# Patient Record
Sex: FEMALE | Race: OTHER | NOT HISPANIC OR LATINO | Employment: OTHER | URBAN - METROPOLITAN AREA
[De-identification: names, ages, dates, MRNs, and addresses within clinical notes are randomized per-mention and may not be internally consistent; named-entity substitution may affect disease eponyms.]

---

## 2019-05-06 NOTE — PATIENT DISCUSSION
PTOSIS  AND BLEPHAROPLASTY OU  WILL FOLLOW AFTER CATARACT  SURGERY TO SEE HOW MUCH VISUAL IMPROVEMENT OCCURS .

## 2019-05-06 NOTE — PATIENT DISCUSSION
POAG, OU: INTRAOCULAR PRESSURE IS WITHIN ACCEPTABLE LIMITS. PT INSTRUCTED TO CONTINUE TRAVATAN Z QHS OU AND RETURN FOR FOLLOW-UP AS SCHEDULED.

## 2019-05-13 NOTE — PATIENT DISCUSSION
New Prescription: prednisolone acetate (prednisolone acetate): drops,suspension: 1% 1 drop four times a day into affected eye 05-

## 2019-05-23 NOTE — PATIENT DISCUSSION
1 DAY POST-OP, OD- PT IOP ELEVAED. BURPED AT TODAY'S VISIT. IOP FROM 58-8. GAVE SAMPLE OF COMBIGAN TO USE 2 TIMES A DAY UNTIL NEXT WEEK. Ocuflox-1 drop 4 times a day until next visit. Acular-1 drop 4 times a day until next visit. Pred-forte-start using today, 1 drop 4 times a day until next visit. Patient was also given instruction sheet.

## 2019-05-23 NOTE — PATIENT DISCUSSION
Continue: prednisolone acetate (prednisolone acetate): drops,suspension: 1% 1 drop four times a day into affected eye 05-

## 2019-06-11 NOTE — PATIENT DISCUSSION
POST OP OD: SOME CELLS SEEN ON TODAY'S EXAM. USE PRED FORTE 4 TIMES A DAY FOR A WEEK THEN BEGIN TO TAPER AGAIN. PT COMPLAINS THAT HIS EYES FEEL DRY AND THAT IS WHEN IT IS PAINFUL. RECOMMENDED USING ARTIFICIAL TEARS 2-3 TIMES A DAY.

## 2019-06-20 NOTE — PATIENT DISCUSSION
1 DAY POST-OP, OS WITH I STENT - Ocuflox-1 drop 4 times a day until next visit. Acular-1 drop 4 times a day until next visit. Pred-forte-start using today, 1 drop 4 times a day until next visit. Patient was also given instruction sheet.

## 2019-06-21 NOTE — PATIENT DISCUSSION
S/P PCIOL OD: IOP INCREASED. [address] INSTILLED STAT. DR. Leeroy Rabago IN TO BURP INCISION SITE. PATIENT LEFT MORE WITH MORE OCULAR COMFORT. CONTINUE TRAVATAN QHS OU, START COMBIGAN BID OS.  FOLLOW-UP WITH DR. Sima Hodge AS SCHEDULED FOR TUESDAY

## 2019-06-24 NOTE — PATIENT DISCUSSION
POSTOP: CELLS PRESENT. IOP ELEVATED OS. PT IS TO STOP TRAVATAN FOR NOW. CONTINUE TO USE COMBIGAN TID AND START ROCKLATAN AT BEDTIME IN THE LEFT EYE. PT WAS BURPED IN OFFICE TODAY TO HELP BRING THE PRESSURE DOWN.  PT IS TO COME BACK IN [facility]

## 2019-06-27 NOTE — PATIENT DISCUSSION
POST OP OS : INFLAMATION , CELL , PATIENT IS TO START PRED FORTE 1 GTT EVERY 2 HOURS WHILE AWAKE , CONTINUE ACULAR AND OCUFLOX 4 TIMES DIALY , TRAVATAN Z 1 DROP AT BED TIME , ROCKLATAN 1 DROP AT BEDTIME AND COMBIGAN 1 DROP TWICE DAILY , WILL FOLLOW BACK ON FRIDAY WITH FREDERICK AND THEN ON TUESDAY WITH FREDERICK IN [place]

## 2019-06-28 NOTE — PATIENT DISCUSSION
POST OP OS : INFLAMATION , CELL , PATIENT IS TO CONTINUE PRED FORTE 1 GTT EVERY 2 HOURS WHILE AWAKE , CONTINUE ACULAR AND OCUFLOX 4 TIMES DIALY , TRAVATAN Z 1 DROP AT BED TIME , ROCKLATAN 1 DROP AT BEDTIME AND COMBIGAN 1 DROP TWICE DAILY ,  FOLLOW UP WITH ME ON TUESDAY IN [place]

## 2019-07-02 NOTE — PATIENT DISCUSSION
POST OP OS : INFLAMATION , CELL , PATIENT IS TO CONTINUE PRED FORTE 1 GTT EVERY 4 HOURS WHILE AWAKE , CONTINUE ACULAR AND OCUFLOX 4 TIMES DIALY , TRAVATAN Z 1 DROP AT BED TIME , ROCKLATAN 1 DROP AT BEDTIME AND COMBIGAN 1 DROP TWICE DAILY , FOLLOW UP WITH ME ON TUESDAY IN [place]

## 2019-07-08 NOTE — PATIENT DISCUSSION
CAT  WITH I STENT POST OP OS:  +SIGNIFICANT CELL AND INCREASE IOP , CONSULT DR. Carlos Terrell  . START PRED FORTE 1 GTT EVERY 2 HOURS AGAIN AND CONTINUE ALL OTHER DROPS PER WRITTEN INSTRUCTIONS .

## 2019-07-10 NOTE — PATIENT DISCUSSION
ANTERIOR UVEITIS, OS: PRESCRIBED PREDNISOLONE 1 DROP EVERY HOUR OS, WHILE AWAKE. RETURN FOR FOLLOW UP AS SCHEDULED.

## 2019-07-10 NOTE — PATIENT DISCUSSION
New Prescription: Combigan (brimonidine-timolol): drops: 0.2-0.5% 1 drop three times a day as directed into left eye 07-

## 2019-07-10 NOTE — PATIENT DISCUSSION
New Prescription: acetazolamide (acetazolamide): tablet: 250 mg 2 tablet every twelve hours as directed by mouth 07-

## 2019-07-10 NOTE — PATIENT DISCUSSION
Uveitis Counseling: I have explained the diagnosis and pathophysiology of uveitis. In isolated circumstances this is usually idiopathic, however recurrent intraocular inflammation may be associated with systemic disease and medical and laboratory workup may be necessary in conjunction with the primary care doctor. I have stressed compliance with medications and follow-up.

## 2019-07-10 NOTE — PATIENT DISCUSSION
New Prescription: prednisolone acetate (prednisolone acetate): drops,suspension: 1% 1 drop as directed into left eye 07-

## 2019-07-10 NOTE — PATIENT DISCUSSION
RETINA IS ATTACHED OU: NO POSTERIOR UVEITIS; NO HOLES OR TEARS SEEN ON DILATED EXAM TODAY.  RETINAL DETACHMENT SIGNS AND SYMPTOMS REVIEWED

## 2019-07-10 NOTE — PATIENT DISCUSSION
POAG, OU: STABLE IOP OD. ELEVATED INTRAOCULAR PRESSURE, OS. PRESCRIBED COMBIGAN 3X/DAY OS, DORZOLAMIDE 3X/DAY OS, AND DIAMOX 500 MG Q12 HR PO. CONTINUE  TRAVATAN QHS OD ONLY (HOLD LEFT EYE DUE TO INFLAMMATION). RETURN FOR FOLLOW AS SCHEDULED.

## 2019-07-11 NOTE — PATIENT DISCUSSION
Continue: prednisolone acetate (prednisolone acetate): drops,suspension: 1% 1 drop as directed into left eye 07-

## 2019-07-11 NOTE — PATIENT DISCUSSION
Continue: Combigan (brimonidine-timolol): drops: 0.2-0.5% 1 drop three times a day as directed into left eye 07-

## 2019-07-11 NOTE — PATIENT DISCUSSION
Continue: acetazolamide (acetazolamide): tablet: 250 mg 2 tablet every twelve hours as directed by mouth 07-

## 2019-07-11 NOTE — PATIENT DISCUSSION
POST OP CATARACTS: INFLAMMATION LOOKS BETTER BUT IS STILL PRESENT. PT IS TO CONTINUE ALL DROPS AS DIRECTED BY DR. Yoly Garner. PT IS TO SEE DR. Marilia Damon.  PT TO SEE DR. Britany Dougherty IN 2 WEEKS FOR FOLLOWUP

## 2019-07-16 NOTE — PATIENT DISCUSSION
CONTINUE TRAVATAN QHS OD ONLY (HOLD LEFT EYE DUE TO INFLAMMATION). DISCONTINUE DIAMOX TABLETS. RETURN FOR FOLLOW AS SCHEDULED.

## 2019-07-16 NOTE — PATIENT DISCUSSION
ANTERIOR UVEITIS, OU: BEGIN PRED TAPER: 4X/DAY OU FOR 2 WEEKS, 3X/DAY OU FOR 2 WEEKS, THEN 2X/DAY OU UNTIL NEXT VISIT. RETURN FOR FOLLOW UP AS SCHEDULED.

## 2019-07-22 NOTE — PATIENT DISCUSSION
POSTOP: CONTIUE COMBIGAN 3X/DAY OS, DORZOLAMIDE 3X/DAY OS. CONTINUE TRAVATAN QHS OD ONLY AS DR. CESAR DIRECTED.  PT IS TO USE PRED FORTE EVERY HOUR FOR 3 DAYS, THEN EVERY 2 HOURS FOR 3 DAYS THEN 6 TIMES A DAY UNTIL WE SEE PT BACK IN 2 WEEKS

## 2019-08-27 NOTE — PATIENT DISCUSSION
Continue: prednisolone acetate (prednisolone acetate): drops,suspension: 1% 1 drop five times a day into affected eye 07-

## 2019-08-27 NOTE — PATIENT DISCUSSION
POAG, OU: RECOMMEND COMBIGAN 3X/DAY OU, DORZOLAMIDE 3X/DAY OU. STOP TRAVATAN QHS OU DUE TO CHRONIC INFLAMMATION. RETURN FOR FOLLOW AS SCHEDULED.

## 2019-09-10 NOTE — PATIENT DISCUSSION
POAG OU: IOP ELEVATED OU. BEGIN TRIAL OF VYZULTA 2X/DAY OU. CONTINUE COMBIGAN 3X/DAY AND DORZOLAMIDE 3X/DAY OU. (SAMPLE OF VYZULTA GIVEN).

## 2019-09-10 NOTE — PATIENT DISCUSSION
New Prescription: prednisone (prednisone): tablet: 10 mg 4 tablet every morning with meals by mouth 09-

## 2019-09-10 NOTE — PATIENT DISCUSSION
New Prescription: Pred Forte (prednisolone acetate): drops,suspension: 1% 1 drop four times a day as directed into both eyes 09-

## 2019-09-10 NOTE — PATIENT DISCUSSION
ANTERIOR UVEITIS, OU: CONTINUE PRED FORTE 4X/DAY OU. DISCUSSED RISK OF INCREASED TOPICAL STEROIDS DUE TO IOP RESPONSE. START PREDNISONE 40 MG PO DAILY. CONTINUE OMEPRAZOLE FOR GI PPX. RETURN FOR FOLLOW UP AS SCHEDULED.

## 2019-09-24 NOTE — PATIENT DISCUSSION
Continue: Pred Forte (prednisolone acetate): drops,suspension: 1% 1 drop four times a day as directed into both eyes 09-

## 2019-09-24 NOTE — PATIENT DISCUSSION
New Prescription: valacyclovir (valacyclovir): tablet: 500 mg 1 tablet three times a day as directed by mouth 09-

## 2019-09-24 NOTE — PATIENT DISCUSSION
POAG OU: REFER TO DR Alec LAO IN [facility]. IOP ELEVATED OU WITH GLAUCOMA CHANGES ON OPTIC DISC AND RNFL OCT. S/P PHACO WITH I-STENT OU (Stefan Jolley). CONTINUE VYZULTA 2X/DAY OU, COMBIGAN 3X/DAY AND DORZOLAMIDE 3X/DAY OU.

## 2019-09-24 NOTE — PATIENT DISCUSSION
ANTERIOR UVEITIS, OU:  HISTORY OF HERPES ZOSTER. CONTINUE PRED FORTE 3X/DAY OU. STOP PREDNISONE (NO IMPROVEMENT WITH 2-WEEK TRIAL). START VALACYCLOVIR 500 MG PO TID. UVEITIS LABS ORDERED.

## 2019-09-24 NOTE — PATIENT DISCUSSION
RETURN TO DR. LAGOS TO OBTAIN LABS FOR UVEITIS: ACE, QUANTIFERON-TB GOLD, SYPHILIS EIA, RPR, HSV-1 AND HSV-2 PCR, ZOSTER PCR, HLA-B27, AND A CHEST X-RAY PA/LAT. No

## 2019-10-22 NOTE — PATIENT DISCUSSION
Continue: Pred Forte (prednisolone acetate): drops,suspension: 1% 1 drop twice a day as directed into both eyes 09-

## 2019-10-22 NOTE — PATIENT DISCUSSION
POAG OU: REFER TO DR Elena LAO IN [place] FOR GLAUCOMA SURGERY EVAL. S/P PHACO WITH I-STENT OU (Joaquim Jadely). CONTINUE VYZULTA 2X/DAY OU, COMBIGAN 3X/DAY AND DORZOLAMIDE 3X/DAY OU.

## 2019-10-22 NOTE — PATIENT DISCUSSION
Continue: Combigan (brimonidine-timolol): drops: 0.2-0.5% 1 drop three times a day as directed into both eyes 10-

## 2019-10-22 NOTE — PATIENT DISCUSSION
ANTERIOR UVEITIS, OU: IMPROVING TODAY. HISTORY OF HERPES ZOSTER. TAPER TO PRED FORTE 2X/DAY OU. CONTINUE VALACYCLOVIR 500 MG PO DAILY. PENDING UVEITIS LABS AND CHEST XRAY ORDERED.

## 2019-10-28 NOTE — PATIENT DISCUSSION
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 DR. Ledezma Bees TO SEE PT BACK IN 1 MONTH FOR A F/U. DR. Billy Peace ALSO SAID THAT PT IS ON A PILL CALLED DIAMOX.

## 2019-11-19 NOTE — PATIENT DISCUSSION
Continue: Pred Forte (prednisolone acetate): drops,suspension: 1% 1 drop four times a day into left eye 09-

## 2019-11-19 NOTE — PATIENT DISCUSSION
ANTERIOR UVEITIS, OU: IMPROVING TODAY. HISTORY OF HERPES ZOSTER. CONTINUE PRED FORTE 2X/DAY OD (FOR LEFT EYE FOLLOW DR Dakota Yanes INSTRUCTIONS). CONTINUE VALACYCLOVIR 500 MG PO DAILY.

## 2019-11-19 NOTE — PATIENT DISCUSSION
POAG, OS: S/P TUBE SHUNT SURGERY; DOING VERY WELL. CONTINUE EYEDROPS IN LEFT EYE AS INSTRUCTED BY DR LAO.

## 2019-11-19 NOTE — PATIENT DISCUSSION
POAG, OD: ELEVATED INTRAOCULAR PRESSURE, OD. CLEARED TO HAVE TUBE SHUNT SURGERY OD WITH DR Philipp Prieto. CONTINUE EYEDROPS OD:  LATANOPROST OD 1 DROP AT BEDTIME. RESTART DORZOLAMIDE OD 3X/DAY AND COMBIGAN 3X/DAY OD. RETURN FOR FOLLOW AS SCHEDULED.

## 2019-12-17 NOTE — PATIENT DISCUSSION
ANTERIOR UVEITIS, OU: IMPROVING TODAY. HISTORY OF HERPES ZOSTER. DECREASED PRED FORTE TO 1X/DAY OD; CONTINUE 2X/DAY OS. CONTINUE VALACYCLOVIR 500 MG PO DAILY. RETURN FOR FOLLOW UP AS SCHEDULED.

## 2019-12-17 NOTE — PATIENT DISCUSSION
POAG, OU: ELEVATED INTRAOCULAR PRESSURE, OD. CLEARED FOR GLAUCOMA FILTERING SURGERY OD. CONTINUE LATANOPROST QHS, COMBIGAN 3X/DAY, AND TRUSOPT 3X/DAY OD. RETURN FOR FOLLOW AS SCHEDULED WITH DR Eulice Nyhan.

## 2019-12-17 NOTE — PATIENT DISCUSSION
Continue: Pred Forte (prednisolone acetate): drops,suspension: 1% 1 drop twice a day into left eye 09-

## 2019-12-17 NOTE — PATIENT DISCUSSION
Continue: Combigan (brimonidine-timolol): drops: 0.2-0.5% 1 drop three times a day as directed into both eyes

## 2020-03-24 NOTE — PATIENT DISCUSSION
ANTERIOR UVEITIS, OU:  HISTORY OF HERPES ZOSTER. BEGIN PRED FORTE 1 DROP Q2H WHILE AWAKE OS. GIVEN DUREZOL SAMPLE TO START, THEN CONTINUE SAME DOSING WITH PRED FORTE WHEN SAMPLE FINISHED. RETURN FOR FOLLOW UP AS SCHEDULED.

## 2020-03-24 NOTE — PATIENT DISCUSSION
New Prescription: prednisolone acetate (PF) (prednisolone acetate (pf)): drops,suspension: 1% 1 drop every two hours as directed into left eye 03-

## 2020-03-24 NOTE — PATIENT DISCUSSION
Stopped Today: prednisolone acetate (PF) (prednisolone acetate (pf)): drops,suspension: 1% 1 drop four times a day into right eye

## 2020-03-24 NOTE — PATIENT DISCUSSION
FLAT ANTERIOR CHAMBER, OS:  RECURRENT FLAT AC AFTER BAERVELDT REVISION WITH XEN GEL STENT. WILL CONSULT WITH DR LAO REGARDING POSTOP MANAGEMENT.

## 2020-03-24 NOTE — PATIENT DISCUSSION
POAG, OU: CONTINUE LATANOPROST QHS OD; TRUSOPT 3X/DAY OU. HOLD COMBIGAN AS INSTRUCTED BY DR LAO. RETURN FOR FOLLOW AS SCHEDULED WITH DR Tamela Redman.

## 2020-03-27 NOTE — PATIENT DISCUSSION
FLAT ANTERIOR CHAMBER, OS: RECURRENT FLAT AC AFTER BAERVELDT REVISION WITH XEN GEL STENT. NO SIGNIFICANT IMPROVEMENT AFTER STOPPING GLAUCOMA EYEDROPS OS FOR 3 DAYS. CONTINUE PRED FORTE Q2H OS WHILE AWAKE (RX GIVEN). RETURN TO DR LAO FOR SURGICAL GLAUCOMA MANAGEMENT.

## 2020-03-27 NOTE — PATIENT DISCUSSION
POAG, OU: CONTINUE LATANOPROST QHS OD; TRUSOPT 3X/DAY OD. HOLD COMBIGAN OD AS INSTRUCTED BY DR LAO. HOLD ALL GLAUCOMA DROPS OS.

## 2020-03-27 NOTE — PATIENT DISCUSSION
Continue: prednisolone acetate (PF) (prednisolone acetate (pf)): drops,suspension: 1% 1 drop every two hours as directed into left eye 03-

## 2020-04-21 NOTE — PATIENT DISCUSSION
ANTERIOR UVEITIS OU: CONTINUE PRED FORTE 1X/DAY OD.  TAPER PRED FORTE OS, AS INSTRUCTED BY DR LAO. RETURN AS SCHEDULED.

## 2020-04-21 NOTE — PATIENT DISCUSSION
FLAT ANTERIOR CHAMBER, OS: RECURRENT FLAT AC AFTER BAERVELDT REVISION WITH XEN GEL STENT. CONTINUE PRED FORTE Q3H OS WHILE AWAKE. RETURN TO DR LAO FOR SURGICAL GLAUCOMA MANAGEMENT.

## 2020-05-19 NOTE — PATIENT DISCUSSION
POAG, OU: IOP SIGNIFICANTLY INCREASED OS. OD: CONTINUE LATANOPROST QHS OD; TRUSOPT 3X/DAY. OS: RECOMMEND STARTING LATANOPROST AND TRUSOPT SAME DOSING TODAY. CALLING DR Joon Oliveira OFFICE TO SCHEDULE GLAUCOMA FOLLOW-UP THIS WEEK.

## 2020-05-19 NOTE — PATIENT DISCUSSION
ANTERIOR UVEITIS OU: BOTH EYES QUIET ON EXAM TODAY. CONTINUE PRED FORTE 1X/DAY OD. CONTINUE PRED FORTE OS BASED ON DR LAO'S INSTRUCTIONS FOR GLAUCOMA POSTOP MANAGEMENT. RETURN AS SCHEDULED.

## 2020-05-19 NOTE — PATIENT DISCUSSION
FLAT ANTERIOR CHAMBER, OS: S/P BAERVELDT REVISION WITH XEN GEL STENT. CONTINUE PRED FORTE Q3H OS WHILE AWAKE. RETURN TO DR LAO FOR SURGICAL GLAUCOMA MANAGEMENT.

## 2020-06-16 NOTE — PATIENT DISCUSSION
POAG, OU: IOP BETTER CONTROLLED TODAY OU. FOLLOW DOSING OF LATANOPROST AND TRUSOPT OU BASED ON DR LAO'S INSTRUCTIONS. RETURN AS SCHEDULED TO DR LAO.

## 2020-06-16 NOTE — PATIENT DISCUSSION
FLAT ANTERIOR CHAMBER, OS: CLEARED FOR ADDITIONAL GLAUCOMA SURGERY AND ANTERIOR CHAMBER REFORMATION WITH DR LAO.

## 2020-09-22 NOTE — PATIENT DISCUSSION
RETINA IS ATTACHED OD: NO HOLES OR TEARS SEEN ON DILATED EXAM TODAY.  RETINAL DETACHMENT SIGNS AND SYMPTOMS REVIEWED

## 2020-09-22 NOTE — PATIENT DISCUSSION
FLAT ANTERIOR CHAMBER, OS: RETURN AS SCHEDULED TO DR LAO, FOR GLAUCOMA SURGERY EVALUATION AND ANTERIOR CHAMBER REFORMATION.

## 2020-09-22 NOTE — PATIENT DISCUSSION
New Prescription: Lotemax (loteprednol etabonate): drops,suspension: 0.5% 1 drop once a day as directed into both eyes 09-

## 2020-09-22 NOTE — PATIENT DISCUSSION
ANTERIOR UVEITIS OU: BOTH EYES QUIET ON EXAM TODAY. STOP PRED FORTE 1X/DAY OU; AND START LOTEMAX 1X/DAY OU (RX AND SAMPLE GIVEN). RETURN AS SCHEDULED FOR FOLLOW-UP WITH DR Ja White.

## 2020-09-22 NOTE — PATIENT DISCUSSION
ADVANCED POAG, OU:  IOP ABOVE GOAL OU. RECOMMEND LATANOPROST QHS OU, AND DORZOLAMIDE 3X/DAY OU. CONTINUE ADDITIONAL GLAUCOMA EYEDROP RECOMMENDATIONS PER DR LAO. RETURN AS SCHEDULED TO DR LAO.

## 2020-12-15 NOTE — PATIENT DISCUSSION
Stopped Today: prednisolone acetate (PF) (prednisolone acetate (pf)): drops,suspension: 1% 1 drop every two hours as directed into left eye 03-

## 2020-12-15 NOTE — PATIENT DISCUSSION
CORNEAL EDEMA AND FLAT ANTERIOR CHAMBER, OS:  INSTRUCTED TO FOLLOW DR GENTRY LAO'S RECOMMENDATION FOR ADDITIONAL ANTERIOR SEGMENT SURGERY AND/OR REFERRAL FOR CORNEA SURGERY.

## 2020-12-15 NOTE — PATIENT DISCUSSION
Continue: Lotemax (loteprednol etabonate): drops,suspension: 0.5% 1 drop once a day as directed into both eyes 09-

## 2020-12-15 NOTE — PATIENT DISCUSSION
ADVANCED POAG, OU:  IOP WITHIN NORMAL LIMITS OD. IOP IMPROVING S/P TUBE REVISION SURGERY OS. CONTINUE TRAVATAN, COMBIGAN, AND DORZOLAMIDE OU. RETURN AS SCHEDULED TO DR LAO.

## 2020-12-15 NOTE — PATIENT DISCUSSION
ANTERIOR UVEITIS OU: BOTH EYES QUIET ON EXAM TODAY. CONTINUE VALACYCLOVIR 500 MG PO DAILY. CONTINUE LOTEMAX 1X/DAY OU. RETURN AS SCHEDULED FOR FOLLOW-UP WITH DR Gordo Franco.

## 2021-03-08 NOTE — PATIENT DISCUSSION
Continue: Combigan (brimonidine-timolol): drops: 0.2-0.5% 1 drop three times a day as directed into left eye 07-
Continue: Travatan Z (travoprost): drops: 0.004% 1 drop at bedtime as directed into right eye
Continue: acetazolamide (acetazolamide): tablet: 250 mg 2 tablet every twelve hours as directed by mouth 07-
Continue: dorzolamide (dorzolamide): drops: 2% 1 drop three times a day into left eye
Continue: prednisolone acetate (prednisolone acetate): drops,suspension: 1% 1 drop as directed into left eye 07-
General:
H20.013
INSTILL 1 DROP INTO LEFT EYE EVERY HOUR WHILE AWAKE.
Medications:
Primary iridocyclitis
both eyes, moderately severe, improving : continue all prescribed drop as instructed  last visit . keep folow up with Dr. Miguel Angel Gallagher .
None

## 2021-10-18 ENCOUNTER — NEW PATIENT COMPREHENSIVE (OUTPATIENT)
Dept: URBAN - METROPOLITAN AREA CLINIC 35 | Facility: CLINIC | Age: 66
End: 2021-10-18

## 2021-10-18 DIAGNOSIS — H52.7: ICD-10-CM

## 2021-10-18 DIAGNOSIS — H25.813: ICD-10-CM

## 2021-10-18 PROCEDURE — 92015 DETERMINE REFRACTIVE STATE: CPT

## 2021-10-18 PROCEDURE — 92004 COMPRE OPH EXAM NEW PT 1/>: CPT

## 2021-10-18 ASSESSMENT — TONOMETRY
OS_IOP_MMHG: 14
OD_IOP_MMHG: 15

## 2021-10-18 ASSESSMENT — VISUAL ACUITY
OD_SC: 20/100
OS_CC: J2
OU_CC: 20/20
OS_CC: 20/25
OD_CC: J2
OS_SC: 20/100
OU_CC: J1
OD_CC: 20/30
OU_SC: 20/70
OU_SC: J10

## 2021-12-14 NOTE — PATIENT DISCUSSION
Anterior Uveitis, OU: Both eyes quiet on exam today. Continue Valacyclovir 500 MG PO Daily. Continue Lotemax 1X/DAY OU. Return as scheduled for follow-up with Dr. Ministerio Clinton.

## 2021-12-14 NOTE — PATIENT DISCUSSION
Continue Latanoprost QHS OU, Dorzolamide BID OU, Combigan BID OU.  Return to Dr Alfredo Stoner as scheduled.

## 2021-12-14 NOTE — PATIENT DISCUSSION
Resolved with Pred Forte and PST injections.  No macular edema OD today.  Unable to assess macular edema OS due to media opacity.  Continue current management.

## 2021-12-14 NOTE — PATIENT DISCUSSION
Discussed guarded visual prognosis due to advanced glaucoma.  Continue Lotemax daily and may increase 2-3x/day for eye pain.

## 2022-01-23 NOTE — PATIENT DISCUSSION
POAG, OU:  I have explained to the patient at length regarding the diagnosis of primary open angle glaucoma and its pathophysiology. I have discussed the various treatment options including medications and surgery. I recommend that the patient begin medical treatment. I emphasized to the patient the importance of compliance with treatment and follow-up appointments. Patient sleeping  1;1 sitter is present       Parviz Mcgregor  01/23/22 [id number]

## 2022-06-28 NOTE — PATIENT DISCUSSION
Continue Latanoprost QHS OU, Dorzolamide BID OU, Combigan BID OU.  Return to Dr Saman Howard as scheduled.

## 2022-06-28 NOTE — PATIENT DISCUSSION
Anterior Uveitis, OU: Both eyes quiet on exam today. Continue Valacyclovir 500 MG PO Daily. Continue Loteprednol 1X/DAY OU. Return as scheduled for follow-up with Dr. Trixie Mcardle.

## 2022-06-28 NOTE — PATIENT DISCUSSION
Clinic hours for Dr. Rubio:  Monday    In Surgery  Tuesday 7:30am - 4:30pm  Wednesday 7:30am - 4:30pm  Thursday       7:30am - 4:30pm  Friday            7:30 - 11am    If you need a refill on your prescription please call your pharmacy and let them know. Please be proactive and call before your medication runs out.    The pharmacy will then contact us for the refill.  Please allow 24-48 hours for the refill to be processed.     If your physician has ordered additional laboratory or radiology testing as part of your ongoing plan of care, please allow 5-7 business days from the day of your lab draw or test for the results to be sent and reviewed by your provider. If your results are critical and require more immediate intervention, you will be contacted sooner. Your results will be conveyed to you via a phone call or letter.    You may be receiving a patient satisfaction survey in the mail.   Please take the time to complete, as your feedback is very important to us.   We strive to make your experience exceptional and your comments help us with that goal.  We look forward to hearing from you.       Resolved with Pred Forte and PST injections.  No macular edema OD today.  Unable to assess macular edema OS due to corneal edema.  Continue current management.

## 2022-10-21 ENCOUNTER — COMPREHENSIVE EXAM (OUTPATIENT)
Dept: URBAN - METROPOLITAN AREA CLINIC 35 | Facility: CLINIC | Age: 67
End: 2022-10-21

## 2022-10-21 DIAGNOSIS — H25.813: ICD-10-CM

## 2022-10-21 DIAGNOSIS — H52.7: ICD-10-CM

## 2022-10-21 PROCEDURE — 92015 DETERMINE REFRACTIVE STATE: CPT

## 2022-10-21 PROCEDURE — 92014 COMPRE OPH EXAM EST PT 1/>: CPT

## 2022-10-21 ASSESSMENT — TONOMETRY
OD_IOP_MMHG: 12
OS_IOP_MMHG: 12

## 2022-10-21 ASSESSMENT — VISUAL ACUITY
OS_CC: 20/20-1
OD_CC: 20/20-1
OD_CC: J1

## 2023-01-10 NOTE — PATIENT DISCUSSION
Anterior Uveitis, OU: Both eyes quiet on exam today. Continue Valacyclovir 500 MG PO Daily. Continue Loteprednol 1X/DAY OU. Return as scheduled for follow-up with Dr. Jaswinder Sweeney.

## 2023-01-10 NOTE — PATIENT DISCUSSION
Continue Latanoprost QHS OU, Dorzolamide BID OU, Combigan BID OU.  Return to Dr Raven Carrillo as scheduled.

## 2023-01-10 NOTE — PATIENT DISCUSSION
Resolved with Pred Forte and PST injections.  No macular edema OD today.  Unable to assess macular edema OS due to corneal edema.  Continue current management.

## 2023-10-23 ENCOUNTER — ESTABLISHED PATIENT (OUTPATIENT)
Dept: URBAN - METROPOLITAN AREA CLINIC 35 | Facility: CLINIC | Age: 68
End: 2023-10-23

## 2023-10-23 DIAGNOSIS — H52.7: ICD-10-CM

## 2023-10-23 DIAGNOSIS — H10.45: ICD-10-CM

## 2023-10-23 DIAGNOSIS — H25.813: ICD-10-CM

## 2023-10-23 DIAGNOSIS — H16.223: ICD-10-CM

## 2023-10-23 PROCEDURE — 92014 COMPRE OPH EXAM EST PT 1/>: CPT

## 2023-10-23 PROCEDURE — 92015 DETERMINE REFRACTIVE STATE: CPT

## 2023-10-23 ASSESSMENT — VISUAL ACUITY
OS_CC: 20/25-2
OU_CC: J1
OU_CC: 20/20-2
OD_CC: J1
OD_CC: 20/20-2
OS_CC: J1

## 2023-10-23 ASSESSMENT — TONOMETRY
OD_IOP_MMHG: 13
OS_IOP_MMHG: 13

## 2023-12-13 NOTE — PATIENT DISCUSSION
Medications: Patient is followed by endocrinology for diabetes who should be prescribing her insulin I will allow at this time but in the future patient needs to attend and keep her endocrine appointment as her diabetes is not and has not been in excellent control

## 2024-10-24 ENCOUNTER — COMPREHENSIVE EXAM (OUTPATIENT)
Dept: URBAN - METROPOLITAN AREA CLINIC 35 | Facility: CLINIC | Age: 69
End: 2024-10-24

## 2024-10-24 DIAGNOSIS — H16.223: ICD-10-CM

## 2024-10-24 DIAGNOSIS — H52.7: ICD-10-CM

## 2024-10-24 DIAGNOSIS — H25.813: ICD-10-CM

## 2024-10-24 PROCEDURE — 92015 DETERMINE REFRACTIVE STATE: CPT

## 2024-10-24 PROCEDURE — 92014 COMPRE OPH EXAM EST PT 1/>: CPT
